# Patient Record
Sex: FEMALE | Race: WHITE | NOT HISPANIC OR LATINO | URBAN - METROPOLITAN AREA
[De-identification: names, ages, dates, MRNs, and addresses within clinical notes are randomized per-mention and may not be internally consistent; named-entity substitution may affect disease eponyms.]

---

## 2017-01-17 ENCOUNTER — COMMUNICATION - HEALTHEAST (OUTPATIENT)
Dept: ENDOCRINOLOGY | Facility: CLINIC | Age: 62
End: 2017-01-17

## 2017-02-01 ENCOUNTER — COMMUNICATION - HEALTHEAST (OUTPATIENT)
Dept: ENDOCRINOLOGY | Facility: CLINIC | Age: 62
End: 2017-02-01

## 2017-02-08 ENCOUNTER — COMMUNICATION - HEALTHEAST (OUTPATIENT)
Dept: ENDOCRINOLOGY | Facility: CLINIC | Age: 62
End: 2017-02-08

## 2017-05-15 ENCOUNTER — COMMUNICATION - HEALTHEAST (OUTPATIENT)
Dept: ENDOCRINOLOGY | Facility: CLINIC | Age: 62
End: 2017-05-15

## 2017-05-15 DIAGNOSIS — C73 THYROID CARCINOMA (H): ICD-10-CM

## 2020-08-11 ENCOUNTER — COMMUNICATION - HEALTHEAST (OUTPATIENT)
Dept: SCHEDULING | Facility: CLINIC | Age: 65
End: 2020-08-11

## 2020-08-12 ENCOUNTER — COMMUNICATION - HEALTHEAST (OUTPATIENT)
Dept: SCHEDULING | Facility: CLINIC | Age: 65
End: 2020-08-12

## 2020-08-13 ENCOUNTER — RECORDS - HEALTHEAST (OUTPATIENT)
Dept: LAB | Facility: CLINIC | Age: 65
End: 2020-08-13

## 2020-08-14 ENCOUNTER — COMMUNICATION - HEALTHEAST (OUTPATIENT)
Dept: GERIATRICS | Facility: CLINIC | Age: 65
End: 2020-08-14

## 2020-08-14 LAB
ANION GAP SERPL CALCULATED.3IONS-SCNC: 9 MMOL/L (ref 5–18)
BUN SERPL-MCNC: 10 MG/DL (ref 8–22)
CALCIUM SERPL-MCNC: 9.4 MG/DL (ref 8.5–10.5)
CHLORIDE BLD-SCNC: 92 MMOL/L (ref 98–107)
CO2 SERPL-SCNC: 26 MMOL/L (ref 22–31)
CREAT SERPL-MCNC: 0.57 MG/DL (ref 0.6–1.1)
GFR SERPL CREATININE-BSD FRML MDRD: >60 ML/MIN/1.73M2
GLUCOSE BLD-MCNC: 93 MG/DL (ref 70–125)
POTASSIUM BLD-SCNC: 4.2 MMOL/L (ref 3.5–5)
SODIUM SERPL-SCNC: 127 MMOL/L (ref 136–145)

## 2020-08-17 ENCOUNTER — COMMUNICATION - HEALTHEAST (OUTPATIENT)
Dept: GERIATRICS | Facility: CLINIC | Age: 65
End: 2020-08-17

## 2020-08-18 ENCOUNTER — OFFICE VISIT - HEALTHEAST (OUTPATIENT)
Dept: GERIATRICS | Facility: CLINIC | Age: 65
End: 2020-08-18

## 2020-08-18 ENCOUNTER — RECORDS - HEALTHEAST (OUTPATIENT)
Dept: LAB | Facility: CLINIC | Age: 65
End: 2020-08-18

## 2020-08-18 DIAGNOSIS — R53.1 GENERAL WEAKNESS: ICD-10-CM

## 2020-08-18 DIAGNOSIS — E87.1 HYPONATREMIA: ICD-10-CM

## 2020-08-18 DIAGNOSIS — E83.42 HYPOMAGNESEMIA: ICD-10-CM

## 2020-08-18 DIAGNOSIS — I10 ESSENTIAL HYPERTENSION: ICD-10-CM

## 2020-08-19 ENCOUNTER — OFFICE VISIT - HEALTHEAST (OUTPATIENT)
Dept: GERIATRICS | Facility: CLINIC | Age: 65
End: 2020-08-19

## 2020-08-19 DIAGNOSIS — E87.1 HYPONATREMIA: ICD-10-CM

## 2020-08-19 DIAGNOSIS — I10 ESSENTIAL HYPERTENSION: ICD-10-CM

## 2020-08-19 DIAGNOSIS — E83.42 HYPOMAGNESEMIA: ICD-10-CM

## 2020-08-19 DIAGNOSIS — R53.1 GENERAL WEAKNESS: ICD-10-CM

## 2020-08-19 LAB
ANION GAP SERPL CALCULATED.3IONS-SCNC: 9 MMOL/L (ref 5–18)
BASOPHILS # BLD AUTO: 0 THOU/UL (ref 0–0.2)
BASOPHILS NFR BLD AUTO: 1 % (ref 0–2)
BUN SERPL-MCNC: 13 MG/DL (ref 8–22)
CALCIUM SERPL-MCNC: 9.1 MG/DL (ref 8.5–10.5)
CHLORIDE BLD-SCNC: 98 MMOL/L (ref 98–107)
CO2 SERPL-SCNC: 25 MMOL/L (ref 22–31)
CREAT SERPL-MCNC: 0.63 MG/DL (ref 0.6–1.1)
EOSINOPHIL # BLD AUTO: 0.3 THOU/UL (ref 0–0.4)
EOSINOPHIL NFR BLD AUTO: 5 % (ref 0–6)
ERYTHROCYTE [DISTWIDTH] IN BLOOD BY AUTOMATED COUNT: 13 % (ref 11–14.5)
GFR SERPL CREATININE-BSD FRML MDRD: >60 ML/MIN/1.73M2
GLUCOSE BLD-MCNC: 89 MG/DL (ref 70–125)
HCT VFR BLD AUTO: 26 % (ref 35–47)
HGB BLD-MCNC: 8.7 G/DL (ref 12–16)
IMM GRANULOCYTES # BLD: 0 THOU/UL
IMM GRANULOCYTES NFR BLD: 0 %
LYMPHOCYTES # BLD AUTO: 1.5 THOU/UL (ref 0.8–4.4)
LYMPHOCYTES NFR BLD AUTO: 24 % (ref 20–40)
MAGNESIUM SERPL-MCNC: 1.7 MG/DL (ref 1.8–2.6)
MCH RBC QN AUTO: 31.4 PG (ref 27–34)
MCHC RBC AUTO-ENTMCNC: 33.5 G/DL (ref 32–36)
MCV RBC AUTO: 94 FL (ref 80–100)
MONOCYTES # BLD AUTO: 1.1 THOU/UL (ref 0–0.9)
MONOCYTES NFR BLD AUTO: 17 % (ref 2–10)
NEUTROPHILS # BLD AUTO: 3.4 THOU/UL (ref 2–7.7)
NEUTROPHILS NFR BLD AUTO: 53 % (ref 50–70)
PLATELET # BLD AUTO: 505 THOU/UL (ref 140–440)
PMV BLD AUTO: 9.8 FL (ref 8.5–12.5)
POTASSIUM BLD-SCNC: 4.2 MMOL/L (ref 3.5–5)
RBC # BLD AUTO: 2.77 MILL/UL (ref 3.8–5.4)
SODIUM SERPL-SCNC: 132 MMOL/L (ref 136–145)
WBC: 6.4 THOU/UL (ref 4–11)

## 2020-08-20 ENCOUNTER — OFFICE VISIT - HEALTHEAST (OUTPATIENT)
Dept: GERIATRICS | Facility: CLINIC | Age: 65
End: 2020-08-20

## 2020-08-20 DIAGNOSIS — E87.1 HYPONATREMIA: ICD-10-CM

## 2020-08-20 DIAGNOSIS — J44.9 CHRONIC OBSTRUCTIVE PULMONARY DISEASE, UNSPECIFIED COPD TYPE (H): ICD-10-CM

## 2020-08-20 DIAGNOSIS — S00.03XD HEMATOMA OF SCALP, SUBSEQUENT ENCOUNTER: ICD-10-CM

## 2020-08-20 DIAGNOSIS — R26.81 GAIT INSTABILITY: ICD-10-CM

## 2020-08-25 ENCOUNTER — OFFICE VISIT - HEALTHEAST (OUTPATIENT)
Dept: GERIATRICS | Facility: CLINIC | Age: 65
End: 2020-08-25

## 2020-08-25 DIAGNOSIS — F32.A ANXIETY AND DEPRESSION: ICD-10-CM

## 2020-08-25 DIAGNOSIS — I10 ESSENTIAL HYPERTENSION: ICD-10-CM

## 2020-08-25 DIAGNOSIS — F41.9 ANXIETY AND DEPRESSION: ICD-10-CM

## 2020-08-25 DIAGNOSIS — R26.81 GAIT INSTABILITY: ICD-10-CM

## 2020-08-25 DIAGNOSIS — J44.9 CHRONIC OBSTRUCTIVE PULMONARY DISEASE, UNSPECIFIED COPD TYPE (H): ICD-10-CM

## 2020-08-27 ENCOUNTER — OFFICE VISIT - HEALTHEAST (OUTPATIENT)
Dept: GERIATRICS | Facility: CLINIC | Age: 65
End: 2020-08-27

## 2020-08-27 DIAGNOSIS — I10 ESSENTIAL HYPERTENSION: ICD-10-CM

## 2020-08-27 DIAGNOSIS — J44.9 CHRONIC OBSTRUCTIVE PULMONARY DISEASE, UNSPECIFIED COPD TYPE (H): ICD-10-CM

## 2020-08-27 DIAGNOSIS — F41.9 ANXIETY AND DEPRESSION: ICD-10-CM

## 2020-08-27 DIAGNOSIS — F32.A ANXIETY AND DEPRESSION: ICD-10-CM

## 2020-08-27 DIAGNOSIS — E87.1 HYPONATREMIA: ICD-10-CM

## 2020-08-31 ENCOUNTER — AMBULATORY - HEALTHEAST (OUTPATIENT)
Dept: GERIATRICS | Facility: CLINIC | Age: 65
End: 2020-08-31

## 2020-11-18 ENCOUNTER — HOME CARE/HOSPICE - HEALTHEAST (OUTPATIENT)
Dept: HOME HEALTH SERVICES | Facility: HOME HEALTH | Age: 65
End: 2020-11-18

## 2021-05-27 ENCOUNTER — RECORDS - HEALTHEAST (OUTPATIENT)
Dept: ADMINISTRATIVE | Facility: CLINIC | Age: 66
End: 2021-05-27

## 2021-05-27 VITALS
SYSTOLIC BLOOD PRESSURE: 131 MMHG | TEMPERATURE: 96.7 F | RESPIRATION RATE: 16 BRPM | OXYGEN SATURATION: 99 % | DIASTOLIC BLOOD PRESSURE: 61 MMHG | HEART RATE: 71 BPM

## 2021-05-28 ENCOUNTER — RECORDS - HEALTHEAST (OUTPATIENT)
Dept: ADMINISTRATIVE | Facility: CLINIC | Age: 66
End: 2021-05-28

## 2021-05-29 ENCOUNTER — RECORDS - HEALTHEAST (OUTPATIENT)
Dept: ADMINISTRATIVE | Facility: CLINIC | Age: 66
End: 2021-05-29

## 2021-05-31 ENCOUNTER — RECORDS - HEALTHEAST (OUTPATIENT)
Dept: ADMINISTRATIVE | Facility: CLINIC | Age: 66
End: 2021-05-31

## 2021-06-02 ENCOUNTER — RECORDS - HEALTHEAST (OUTPATIENT)
Dept: ADMINISTRATIVE | Facility: CLINIC | Age: 66
End: 2021-06-02

## 2021-06-10 NOTE — TELEPHONE ENCOUNTER
[facility] Nurse Triage Telephone Note      Provider: LISA Booker  Facility: [facility]    Facility Type: TCU    Caller: Janet  Call Back Number:  [phone]    Allergies: Citalopram; Fluoxetine; Levothyroxine (bulk); Nicoderm; Pravastatin; Rosuvastatin; and Codeine    Reason for call: Pt C/O no Bm for 1 week. Abdomin not distended, denies pain, bowel sounds sluggish X 4. Not on any bowel meds.     Verbal Order/Direction given by Provider: Do rectalcheck & give Duloclax supp R first. Miralax 17Gm po daily PRN, Senna S 1 two times a day.    Provider giving order: LISA Booker    Verbal order given to: Janet Haynes RN

## 2021-06-10 NOTE — PROGRESS NOTES
[facility]    Facility:   [facility] [[id number]]   Code Status: FULL CODE      CHIEF COMPLAINT/REASON FOR VISIT:  Chief Complaint   Patient presents with     Problem Visit     hx cva, rehab, htn,sleep       HISTORY:      HPI: Rafaela is a 64 y.o. female who the transitional care unit and who I had the opportunity to revisit with not only secondary to her hospitalization August 9, 2020 through August 13, 2020 secondary to a fall sustaining a left scalp laceration  Hyponatremia along with discussion of her rehabilitation process.  Regarding rehabilitation I did have a chance to talk to  and staff to see if she would be eligible to be able to go home.  She wants to go home soon as possible and she does feel like she is learning some things here but would like to go home.  Does have a history of CVA along with a history of gait instability.  She is on a regular diet and getting extra nutrient supplements.  No respiratory issues and no inhalers as needed.  She has been normotensive and afebrile and also on room air.  The left scalp does have a small hematoma but healing well.  Recently increase the trazodone 100 mg and that has been helpful for her sleep.  Denies discomfort.  Constipation is resolved.    Past Medical History:   Diagnosis Date     Adrenal adenoma, left      Anxiety     per pt     Depression     per pt     History of alcohol abuse      History of CVA with residual deficit     head and neck tremor, right sided weakness     History of skin cancer of unknown type      Hypertension     on lisinopril per pt is well controlled     Memory impairment     per pt     Postsurgical hypothyroidism      Thyroid cancer (H)      Tobacco use disorder      Urinary incontinence              Family History   Adopted: Yes   Problem Relation Age of Onset     Hypertension Mother      Diabetes Sister      Social History     Socioeconomic History     Marital status:       Spouse name: Not on file     Number of children: Not on file     Years of education: Not on file     Highest education level: Not on file   Occupational History     Not on file   Social Needs     Financial resource strain: Not on file     Food insecurity     Worry: Not on file     Inability: Not on file     Transportation needs     Medical: Not on file     Non-medical: Not on file   Tobacco Use     Smoking status: Current Every Day Smoker     Packs/day: 1.00     Years: 10.00     Pack years: 10.00     Smokeless tobacco: Never Used   Substance and Sexual Activity     Alcohol use: Yes     Alcohol/week: 35.0 standard drinks     Types: 35 Cans of beer per week     Comment: 4-5 beers per day     Drug use: No     Comment: pt denies using drugs of any kind     Sexual activity: Not on file   Lifestyle     Physical activity     Days per week: Not on file     Minutes per session: Not on file     Stress: Not on file   Relationships     Social connections     Talks on phone: Not on file     Gets together: Not on file     Attends Voodoo service: Not on file     Active member of club or organization: Not on file     Attends meetings of clubs or organizations: Not on file     Relationship status: Not on file     Intimate partner violence     Fear of current or ex partner: Not on file     Emotionally abused: Not on file     Physically abused: Not on file     Forced sexual activity: Not on file   Other Topics Concern     Not on file   Social History Narrative     Not on file         Review of Systems  She currently denies any fever chills fatigue cough or cold or flulike symptoms nausea vomiting diarrhea dysuria headache rashes unusual myalgias or arthralgias.  History of alcohol intoxication, anxiety, hypertension, COPD.    Current Outpatient Medications   Medication Sig Note     albuterol (PROVENTIL HFA;VENTOLIN HFA) 90 mcg/actuation inhaler Inhale 2 puffs every 6 (six) hours as needed for wheezing.      DULoxetine  (CYMBALTA) 60 MG capsule Take 60 mg by mouth daily.      folic acid (FOLVITE) 1 MG tablet Take 1 tablet (1 mg total) by mouth daily.      lisinopril (PRINIVIL,ZESTRIL) 10 MG tablet Take 10 mg by mouth daily.      multivitamin therapeutic tablet Take 1 tablet by mouth daily.      polyethylene glycol (MIRALAX) 17 gram packet Take 17 g by mouth daily as needed.      senna-docusate (SENNOSIDES-DOCUSATE SODIUM) 8.6-50 mg tablet Take 1 tablet by mouth 2 (two) times a day.      SYNTHROID 88 mcg tablet Take 88 mcg by mouth daily.      thiamine 100 MG tablet Take 1 tablet (100 mg total) by mouth daily.      tiotropium-olodateroL (STIOLTO RESPIMAT) 2.5-2.5 mcg/actuation Mist inhaler Inhale 1 puff daily as needed (As needed).  8/9/2020: Prescribed but patient not compliant      traZODone (DESYREL) 50 MG tablet Take 1 tablet (50 mg total) by mouth at bedtime. (Patient taking differently: Take 100 mg by mouth at bedtime. )        There were no vitals filed for this visit.  Blood pressure 96/52, pulse 78, respirations 16, temperature 97.8  Physical Exam  Head is normocephalic.  Left posterior scalp does have hematoma.  neck is supple without adenopathy. There is bruising to the left posterior scalp and neck area.  Lung sounds are clear throughout.  Cardiovascular is normal without murmurs.  No lower extremity edema.  Gastrointestinal soft and nontender and positive bowel sounds.  Musculoskeletal she is an assist of 1.  Not having any discomfort.  Psychiatric: Pleasant affect.      LABS:   Lab Results   Component Value Date    WBC 6.4 08/19/2020    HGB 8.7 (L) 08/19/2020    HCT 26.0 (L) 08/19/2020    MCV 94 08/19/2020     (H) 08/19/2020     Results for orders placed or performed in visit on 08/19/20   Basic Metabolic Panel   Result Value Ref Range    Sodium 132 (L) 136 - 145 mmol/L    Potassium 4.2 3.5 - 5.0 mmol/L    Chloride 98 98 - 107 mmol/L    CO2 25 22 - 31 mmol/L    Anion Gap, Calculation 9 5 - 18 mmol/L    Glucose  89 70 - 125 mg/dL    Calcium 9.1 8.5 - 10.5 mg/dL    BUN 13 8 - 22 mg/dL    Creatinine 0.63 0.60 - 1.10 mg/dL    GFR MDRD Af Amer >60 >60 mL/min/1.73m2    GFR MDRD Non Af Amer >60 >60 mL/min/1.73m2       Lab Results   Component Value Date    ALT 14 08/09/2020    AST 30 08/09/2020    ALKPHOS 59 08/09/2020    BILITOT 0.3 08/09/2020         ASSESSMENT:      ICD-10-CM    1. Chronic obstructive pulmonary disease, unspecified COPD type (H)  J44.9    2. Essential hypertension  I10    3. Gait instability  R26.81    4. Anxiety and depression  F41.9     F32.9        PLAN:    She is examined.  Blood pressures are good.  She has participated in therapy.  Once again I did relate to the staff and  including charge nurse that she would like a meeting to talk up going home.      Electronically signed by: Michael Duane Johnson, CNP

## 2021-06-10 NOTE — PROGRESS NOTES
[facility]    Facility:   [facility] [[id number]]   Code Status: FULL CODE  PCP: Maria Elena Morin MD   Phone: [phone]   Fax: [phone]      CHIEF COMPLAINT/REASON FOR VISIT:  Chief Complaint   Patient presents with     Discharge Summary       HISTORY COURSE:  Rafaela is a 64 y.o. female who was admitted to the hospital August 9, 2020 through August 13, 2020 secondary to a fall with a scalp laceration.  Her sodium level was 109 and was sent to the intensive care unit for close monitoring and management.  She does have a history of alcohol abuse and also with the admission alcohol intoxication with complications.  She drinks approximately 5 beers per day and the alcohol level was 113.  Did not show or exhibit any signs of alcohol withdrawal.  She was profoundly hyponatremic with a sodium level of 109 baseline is between 122-128.  Urine and serum osmolality were normal.  Suggested that the hyponatremia was secondary to acute cord and ongoing alcohol use disorder.  The sodium did stabilize at 127 and then placed her on a 1800 mL daily fluid restriction.  Regarding the scalp injuries she had a hematoma and a head CT did note the hematoma without fracture.  There was concern for UTI secondary to leukosis and lites on admission were 18.6 did receive 3 days of ceftriaxone but no symptoms of UTI.  Also did have hypomagnesemia with a magnesium level of 1.3.  She also has a history of moderate malnutrition and unintentional weight loss and did receive a regular diet along with mealtime supplements.  Her liver function tests are abnormal.  Lipase was at 15 EKG did show sinus tachycardia.  Negative CT cervical spine and the chest x-ray was clear.  She has been able to participate with the rehabilitation services.  States that she has been able to ambulate over 300 feet and a low fall risk.  Her slums 19/30.  We had a chance to talk about the strength that she has gained.   Her only issue is that she had bouts of constipation.  For sleep we did increase the trazodone 100 mg in at bedtime rather than 50 mg but at home she was on 200 mg at bedtime.  Sodium level 132 prior to that was 127 on August 14 and she is on a free water restriction of 1800 cc.  Regular diet.  Getting extra nutrient supplements.  Review of Systems  She currently denies any fever chills fatigue cough or cold or flulike symptoms nausea vomiting diarrhea dysuria headache rashes unusual myalgias or arthralgias.  History of alcohol intoxication, anxiety, hypertension, COPD.  There were no vitals filed for this visit.  Blood pressure 112/76, pulse 80, respirations 18 and temperature 97.1  Physical Exam  Head is normocephalic.   neck is supple without adenopathy.  Lung sounds are clear throughout.  Cardiovascular is normal without murmurs.  No lower extremity edema.  Gastrointestinal soft and nontender.  Musculoskeletal ; able to ambulate over 300 feet.  Not having any discomfort.  Psychiatric: Pleasant affect.        Lab Results   Component Value Date    WBC 6.4 08/19/2020    HGB 8.7 (L) 08/19/2020    HCT 26.0 (L) 08/19/2020    MCV 94 08/19/2020     (H) 08/19/2020     Results for orders placed or performed in visit on 08/19/20   Basic Metabolic Panel   Result Value Ref Range    Sodium 132 (L) 136 - 145 mmol/L    Potassium 4.2 3.5 - 5.0 mmol/L    Chloride 98 98 - 107 mmol/L    CO2 25 22 - 31 mmol/L    Anion Gap, Calculation 9 5 - 18 mmol/L    Glucose 89 70 - 125 mg/dL    Calcium 9.1 8.5 - 10.5 mg/dL    BUN 13 8 - 22 mg/dL    Creatinine 0.63 0.60 - 1.10 mg/dL    GFR MDRD Af Amer >60 >60 mL/min/1.73m2    GFR MDRD Non Af Amer >60 >60 mL/min/1.73m2       Lab Results   Component Value Date    ALT 14 08/09/2020    AST 30 08/09/2020    ALKPHOS 59 08/09/2020    BILITOT 0.3 08/09/2020 August 19, 2020 magnesium 1.7  Lab Results   Component Value Date    TSH 4.60 08/09/2020       MEDICATION LIST:  Current Outpatient  Medications   Medication Sig     acetaminophen (TYLENOL) 500 MG tablet Take 1,000 mg by mouth daily before breakfast.     albuterol (PROVENTIL HFA;VENTOLIN HFA) 90 mcg/actuation inhaler Inhale 2 puffs every 6 (six) hours as needed for wheezing.     DULoxetine (CYMBALTA) 60 MG capsule Take 60 mg by mouth daily.     folic acid (FOLVITE) 1 MG tablet Take 1 tablet (1 mg total) by mouth daily.     lisinopril (PRINIVIL,ZESTRIL) 10 MG tablet Take 10 mg by mouth daily.     multivitamin therapeutic tablet Take 1 tablet by mouth daily.     polyethylene glycol (MIRALAX) 17 gram packet Take 17 g by mouth daily as needed.     senna-docusate (SENNOSIDES-DOCUSATE SODIUM) 8.6-50 mg tablet Take 1 tablet by mouth 2 (two) times a day.     SYNTHROID 88 mcg tablet Take 88 mcg by mouth daily.     thiamine 100 MG tablet Take 1 tablet (100 mg total) by mouth daily.     tiotropium-olodateroL (STIOLTO RESPIMAT) 2.5-2.5 mcg/actuation Mist inhaler Inhale 1 puff daily as needed (As needed).      traZODone (DESYREL) 50 MG tablet Take 1 tablet (50 mg total) by mouth at bedtime. (Patient taking differently: Take 100 mg by mouth at bedtime. )       DISCHARGE DIAGNOSIS:    ICD-10-CM    1. Hyponatremia  E87.1    2. Chronic obstructive pulmonary disease, unspecified COPD type (H)  J44.9    3. Essential hypertension  I10    4. Anxiety and depression  F41.9     F32.9        MEDICAL EQUIPMENT NEEDS:  None    DISCHARGE PLAN/FACE TO FACE:  I certify that services are/were furnished while this patient was under the care of a physician and that a physician or an allowed non-physician practitioner (NPP), had a face-to-face encounter that meets the physician face-to-face encounter requirements. The encounter was in whole, or in part, related to the primary reason for home health. The patient is confined to his/her home and needs intermittent skilled nursing, physical therapy, speech-language pathology, or the continued need for occupational therapy. A plan of  care has been established by a physician and is periodically reviewed by a physician.  Date of Face-to-Face Encounter: August 27, 2020    I certify that, based on my findings, the following services are medically necessary home health services: She will be discharging to home with current medications along with home care services from [facility] including physical therapy and nursing.  Her anticipated discharge date is August 29, 2020    My clinical findings support the need for the above skilled services because: (Please write a brief narrative summary that describes what the RN, PT, SLP, or other services will be doing in the home. A list of diagnoses in this section does not meet the CMS requirements.)  She will require the skilled services secondary to not only most recent hospitalization but also home safety and transfers along with nursing medication management.    This patient is homebound because: (Please write a brief narrative summary describing the functional limitations as to why this patient is homebound and specifically what makes this patient homebound.)  Secondary to chronic multiple medical conditions including her recent hospitalization along with home safety and nursing for medication management    The patient is, or has been, under my care and I have initiated the establishment of the plan of care. This patient will be followed by a physician who will periodically review the plan of care.    Schedule follow up visit with primary care provider within 7 days to reestablish care.  She will follow-up with her primary care doctor regarding medication management and any future laboratory including her sodium level.  We went over medications again and does feel comfortable with the current medications as well as her overall care and did feel that it was a very successful and productive transitional care stay.    Discharge coordination care greater than 30 minutes  Electronically signed by: Michael Duane  Roney, CNP

## 2021-06-10 NOTE — PROGRESS NOTES
[facility]      Facility:    [facility] [[id number]]  Code Status: FULL CODE      Chief Complaint/Reason for Visit:  Chief Complaint   Patient presents with     H & P     Severe hyponatremia, alcoholic intoxication, anxiety and depression, essential hypertension, chronic obstructive pulmonary disease, low magnesium levels, acute cystitis without hematuria, alcohol withdrawal syndrome with complications, tobacco abuse.       HPI:   Rafaela is a 64 y.o. female who admitted to the hospital 8/9/2020.  She does have COPD from years of smoking and also hypothyroidism.  Hypertension she also does use a lot of alcohol and she came into the hospital through the [facility] emergency department have a low sodium of 109.  There is concern for acute alcohol withdrawal and blood alcohol was 113.  She did not exhibit any signs of acute alcohol withdrawal during the inpatient stay.  And she is open to quitting drinking.  She had a scalp laceration that was repaired.    Patient also had profound hyponatremia acutely on top of chronic hyponatremia with a baseline sodium of 1 22-1 28.  On the 14th it was rechecked and it was 127 and will continue to monitor.  In the hospital she was given careful rehydration electrolyte management.  Her magnesium was also low.  Potassium was pretty much normal.  She was treated properly and transferred here to the TCU at [facility] in stable condition.    Patient seen in chair comfortably says she feels okay we did discuss her drinking and smoking in detail and she said she is quit both of them does not want to go back.  I did explain her sodium on the 14th was 127 which is within her normal baseline.  She says she does not have any pain issues but pain in her head and she does have the sutures in at this time.    Sutures are curled around difficult to take out nurse practitioner is going to take them out tomorrow and he knows about this and has been  talking to the nursing staff.  She otherwise is doing okay and has no new issues.    Past Medical History:  Past Medical History:   Diagnosis Date     Adrenal adenoma, left      Anxiety     per pt     Depression     per pt     History of alcohol abuse      History of CVA with residual deficit     head and neck tremor, right sided weakness     History of skin cancer of unknown type      Hypertension     on lisinopril per pt is well controlled     Memory impairment     per pt     Postsurgical hypothyroidism      Thyroid cancer (H)      Tobacco use disorder      Urinary incontinence            Surgical History:  Past Surgical History:   Procedure Laterality Date     APPENDECTOMY       History of  section       MIDLINE INSERTION - DOUBLE LUMEN  8/10/2020          THYROID SURGERY         Family History:   Family History   Adopted: Yes   Problem Relation Age of Onset     Hypertension Mother      Diabetes Sister        Social History:    Social History     Socioeconomic History     Marital status:      Spouse name: None     Number of children: None     Years of education: None     Highest education level: None   Occupational History     None   Social Needs     Financial resource strain: None     Food insecurity     Worry: None     Inability: None     Transportation needs     Medical: None     Non-medical: None   Tobacco Use     Smoking status: Current Every Day Smoker     Packs/day: 1.00     Years: 10.00     Pack years: 10.00     Smokeless tobacco: Never Used   Substance and Sexual Activity     Alcohol use: Yes     Alcohol/week: 35.0 standard drinks     Types: 35 Cans of beer per week     Comment: 4-5 beers per day     Drug use: No     Comment: pt denies using drugs of any kind     Sexual activity: None   Lifestyle     Physical activity     Days per week: None     Minutes per session: None     Stress: None   Relationships     Social connections     Talks on phone: None     Gets together: None     Attends  Yazdanism service: None     Active member of club or organization: None     Attends meetings of clubs or organizations: None     Relationship status: None     Intimate partner violence     Fear of current or ex partner: None     Emotionally abused: None     Physically abused: None     Forced sexual activity: None   Other Topics Concern     None   Social History Narrative     None          Review of Systems   Constitutional:        Patient denies any pain fevers chills nausea vomit diarrhea change in vision hearing taste or smell weakness one-sided chest pain shortness of breath.  Is no current shortness no polyphagia polydipsia polyuria depression or anxiety and the main review of systems is negative.       Vitals:    08/19/20 1232   BP: 131/61   Pulse: 71   Resp: 16   Temp: 96.7  F (35.9  C)   SpO2: 99%       Physical Exam  Constitutional:       General: She is not in acute distress.  HENT:      Head:      Comments: Sutures are still embedded and there will come out tomorrow.     Mouth/Throat:      Mouth: Mucous membranes are moist.      Pharynx: Oropharynx is clear.   Eyes:      General:         Right eye: No discharge.         Left eye: No discharge.   Cardiovascular:      Pulses: Normal pulses.   Pulmonary:      Effort: Pulmonary effort is normal. No respiratory distress.   Abdominal:      General: There is distension.      Tenderness: There is no abdominal tenderness.   Skin:     General: Skin is warm and dry.   Neurological:      Mental Status: She is alert. Mental status is at baseline.   Psychiatric:         Mood and Affect: Mood normal.         Behavior: Behavior normal.         Medication List:  Current Outpatient Medications   Medication Sig     albuterol (PROVENTIL HFA;VENTOLIN HFA) 90 mcg/actuation inhaler Inhale 2 puffs every 6 (six) hours as needed for wheezing.     DULoxetine (CYMBALTA) 60 MG capsule Take 60 mg by mouth daily.     folic acid (FOLVITE) 1 MG tablet Take 1 tablet (1 mg total) by mouth  daily.     lisinopril (PRINIVIL,ZESTRIL) 10 MG tablet Take 10 mg by mouth daily.     multivitamin therapeutic tablet Take 1 tablet by mouth daily.     polyethylene glycol (MIRALAX) 17 gram packet Take 17 g by mouth daily as needed.     senna-docusate (SENNOSIDES-DOCUSATE SODIUM) 8.6-50 mg tablet Take 1 tablet by mouth 2 (two) times a day.     SYNTHROID 88 mcg tablet Take 88 mcg by mouth daily.     thiamine 100 MG tablet Take 1 tablet (100 mg total) by mouth daily.     tiotropium-olodateroL (STIOLTO RESPIMAT) 2.5-2.5 mcg/actuation Mist inhaler Inhale 1 puff daily as needed (As needed).      traZODone (DESYREL) 50 MG tablet Take 1 tablet (50 mg total) by mouth at bedtime. (Patient taking differently: Take 100 mg by mouth at bedtime. )       Labs: On 8/14/2020 sodium was 127, potassium 4.2, CO2 was 26, calcium was 9.4, BUN was 10, creatinine 0.57, GFR is greater than 60.      Assessment:    ICD-10-CM    1. Hyponatremia  E87.1    2. Hypomagnesemia  E83.42    3. Essential hypertension  I10    4. General weakness  R53.1        Plan: Plan at this time will continue with physical and occupational therapy at this time we will also check a magnesium tomorrow and check another sodium tomorrow.  We will also had nurse practitioner remove the stay sutures tomorrow if not they can get it then she will need to go in to have the removed.  However I believe our nurse petitioner can do that okay at this time.  I will continue to monitor above medical problems and no other changes to care plan at this time.        Electronically signed by: David Pereira, DO

## 2021-06-10 NOTE — TELEPHONE ENCOUNTER
[facility] Nurse Triage Telephone Note      Provider: LISA Booker  Facility: [facility]    Facility Type: TCU    Caller: Elmer  Call Back Number:  [phone]    Allergies: Citalopram; Fluoxetine; Levothyroxine (bulk); Nicoderm; Pravastatin; Rosuvastatin; and Codeine    Reason for call: Nurse reporting BMP results.  Patient is on a 1800cc/day total fluid restriction.  Also currently receiving Lisinopril 10mg daily.       Verbal Order/Direction given by Provider: No new orders.      Provider giving order: LISA Booker    Verbal order given to: Elmer Wright RN

## 2021-06-10 NOTE — PROGRESS NOTES
[facility]    Facility:   [facility] [[id number]]   Code Status: FULL CODE      CHIEF COMPLAINT/REASON FOR VISIT:  Chief Complaint   Patient presents with     Problem Visit     tcu, cog, nutrition, hypoNat, etoh       HISTORY:      HPI: Rafaela is a 64 y.o. female who was admitted to the hospital August 9, 2020 through August 13, 2020 secondary to a fall with a scalp laceration.  Her sodium level was 109 and was sent to the intensive care unit for close monitoring and management.  She does have a history of alcohol abuse and also with the admission alcohol intoxication with complications.  She drinks approximately 5 beers per day and the alcohol level was 113.  Did not show or exhibit any signs of alcohol withdrawal.  She was profoundly hyponatremic with a sodium level of 109 baseline is between 122-128.  Urine and serum osmolality were normal.  Suggested that the hyponatremia was secondary to acute cord and ongoing alcohol use disorder.  The sodium did stabilize at 127 and then placed her on a 1800 mL daily fluid restriction.  Regarding the scalp injuries she had a hematoma and a head CT did note the hematoma without fracture.  There was concern for UTI secondary to leukosis and lites on admission were 18.6 did receive 3 days of ceftriaxone but no symptoms of UTI.  Also did have hypomagnesemia with a magnesium level of 1.3.  She also has a history of moderate malnutrition and unintentional weight loss and did receive a regular diet along with mealtime supplements.  Her liver function tests are abnormal.  Lipase was at 15 EKG did show sinus tachycardia.  Negative CT cervical spine and the chest x-ray was clear.  She currently is in the transitional care unit to try to improve her strength balance and overall conditioning.  She is working with a walker in the transitional care unit at home she also does have a four-wheel walker but otherwise self propelling a wheelchair  in the hallway.  She initially is from [place] and lived there in [facility] for about 50 years and now living in [place].  Yesterday I did receive a phone call that she is constipated I did do a suppository and put her on some stool softeners which have been successful.  She currently has been normotensive and afebrile and also on room air.  Her appetite is good.  Denies discomfort.  Had a chat talk about her laboratory studies as well as repeat these laboratory studies along with the rehabilitation process.     Past Medical History:   Diagnosis Date     Adrenal adenoma, left      Anxiety     per pt     Depression     per pt     History of alcohol abuse      History of CVA with residual deficit     head and neck tremor, right sided weakness     History of skin cancer of unknown type      Hypertension     on lisinopril per pt is well controlled     Memory impairment     per pt     Postsurgical hypothyroidism      Thyroid cancer (H)      Tobacco use disorder      Urinary incontinence              Family History   Adopted: Yes   Problem Relation Age of Onset     Hypertension Mother      Diabetes Sister      Social History     Socioeconomic History     Marital status:      Spouse name: Not on file     Number of children: Not on file     Years of education: Not on file     Highest education level: Not on file   Occupational History     Not on file   Social Needs     Financial resource strain: Not on file     Food insecurity     Worry: Not on file     Inability: Not on file     Transportation needs     Medical: Not on file     Non-medical: Not on file   Tobacco Use     Smoking status: Current Every Day Smoker     Packs/day: 1.00     Years: 10.00     Pack years: 10.00     Smokeless tobacco: Never Used   Substance and Sexual Activity     Alcohol use: Yes     Alcohol/week: 35.0 standard drinks     Types: 35 Cans of beer per week     Comment: 4-5 beers per day     Drug use: No     Comment: pt denies  using drugs of any kind     Sexual activity: Not on file   Lifestyle     Physical activity     Days per week: Not on file     Minutes per session: Not on file     Stress: Not on file   Relationships     Social connections     Talks on phone: Not on file     Gets together: Not on file     Attends Nondenominational service: Not on file     Active member of club or organization: Not on file     Attends meetings of clubs or organizations: Not on file     Relationship status: Not on file     Intimate partner violence     Fear of current or ex partner: Not on file     Emotionally abused: Not on file     Physically abused: Not on file     Forced sexual activity: Not on file   Other Topics Concern     Not on file   Social History Narrative     Not on file         Review of Systems  She currently denies any fever chills fatigue cough or cold or flulike symptoms nausea vomiting diarrhea dysuria headache rashes unusual myalgias or arthralgias.  History of alcohol intoxication, anxiety, hypertension, COPD.    Current Outpatient Medications:      albuterol (PROVENTIL HFA;VENTOLIN HFA) 90 mcg/actuation inhaler, Inhale 2 puffs every 6 (six) hours as needed for wheezing., Disp: 1 Inhaler, Rfl: 0     DULoxetine (CYMBALTA) 60 MG capsule, Take 60 mg by mouth daily., Disp: , Rfl:      folic acid (FOLVITE) 1 MG tablet, Take 1 tablet (1 mg total) by mouth daily., Disp: 30 tablet, Rfl: 0     lisinopril (PRINIVIL,ZESTRIL) 10 MG tablet, Take 10 mg by mouth daily., Disp: , Rfl:      multivitamin therapeutic tablet, Take 1 tablet by mouth daily., Disp: 30 tablet, Rfl: 0     SYNTHROID 88 mcg tablet, Take 88 mcg by mouth daily., Disp: , Rfl:      thiamine 100 MG tablet, Take 1 tablet (100 mg total) by mouth daily., Disp: 30 tablet, Rfl: 0     tiotropium-olodateroL (STIOLTO RESPIMAT) 2.5-2.5 mcg/actuation Mist inhaler, Inhale 1 puff daily as needed (As needed). , Disp: , Rfl:      traZODone (DESYREL) 50 MG tablet, Take 1 tablet (50 mg total) by mouth at  bedtime. (Patient taking differently: Take 100 mg by mouth at bedtime. ), Disp: 60 tablet, Rfl: 0    There were no vitals filed for this visit.  Blood pressure 130/76 pulse 82 respiration 16 temperature 97.4 saturation room air 99%  Physical Exam  Head is normocephalic.  Left posterior scalp does have a hematoma in a couple of staples.  Conjunctiva is clear neck without adenopathy.  There is bruising to the left posterior scalp and neck area.  Lung sounds are clear throughout.  Cardiovascular is normal without murmurs.  No lower extremity edema.  Gastrointestinal soft and nontender and positive bowel sounds.  Musculoskeletal she is an assist of 1.  Not having any comfort.  Psychiatric: Pleasant affect.  LABS:   Lab Results   Component Value Date    WBC 8.1 08/10/2020    HGB 10.9 (L) 08/10/2020    HCT 29.7 (L) 08/10/2020    MCV 87 08/10/2020     08/10/2020     Results for orders placed or performed in visit on 08/14/20   Basic Metabolic Panel   Result Value Ref Range    Sodium 127 (L) 136 - 145 mmol/L    Potassium 4.2 3.5 - 5.0 mmol/L    Chloride 92 (L) 98 - 107 mmol/L    CO2 26 22 - 31 mmol/L    Anion Gap, Calculation 9 5 - 18 mmol/L    Glucose 93 70 - 125 mg/dL    Calcium 9.4 8.5 - 10.5 mg/dL    BUN 10 8 - 22 mg/dL    Creatinine 0.57 (L) 0.60 - 1.10 mg/dL    GFR MDRD Af Amer >60 >60 mL/min/1.73m2    GFR MDRD Non Af Amer >60 >60 mL/min/1.73m2     Lab Results   Component Value Date    TSH 4.60 08/09/2020       No results found for: CHOL  No results found for: HDL  No results found for: LDLCALC  No results found for: TRIG  No components found for: CHOLHDL  Lab Results   Component Value Date    ALBUMIN 3.1 (L) 08/10/2020         ASSESSMENT:      ICD-10-CM    1. Hyponatremia  E87.1    2. General weakness  R53.1    3. Hypomagnesemia  E83.42    4. Essential hypertension  I10        PLAN:    Had a chance to go over her laboratory studies and they did recheck her TSH.  Meantime tomorrow we will check her BMP  magnesium and CBC due to hyponatremia exists.  The suppository and stool softeners are helpful we will remove the staples of her scalp today.  Getting extra nutrient supplements twice daily.  She states that she is about 50% better since the hospitalization and will be working with therapy.    For documentation purposes total visit 35 minutes to which over 50% was spent with the patient going over her hospitalization and her chronic medical conditions along with her laboratory studies the stay on the transitional care unit, nutrition, future laboratory studies and management of her chronic medical conditions.        Electronically signed by: Michael Duane Johnson, CNP

## 2021-06-10 NOTE — PROGRESS NOTES
[facility]    Facility:   [facility] [[id number]]   Code Status: FULL CODE      CHIEF COMPLAINT/REASON FOR VISIT:  Chief Complaint   Patient presents with     Problem Visit     rehab, fall, htn, bp, labs,        HISTORY:      HPI: Rafaela is a 64 y.o. female who I had the pleasure revisiting with not only secondary to her hospitalization August 9 through August 13, 2020 secondary to a fall sustaining a scalp laceration along with hyponatremia as well as discussion of her rehabilitation process and laboratory studies  Regarding therapy they would like to keep her here another 2 weeks.  The dizziness has significantly decreased.  There is a questionable concerns regarding balance and her slums 19/30.  Went over her laboratory studies and the sodium is now 132 prior to that on August 14 the sodium was at 127.  Magnesium 1.7.  She does have a couple scalp staples in place the staff did not remove them so I was able to remove them today.  No constipation.  For sleep the 100 mg of trazodone rather than the 50 mg has been helpful.  She is in good spirits today.  Did not have any other questions    Past Medical History:   Diagnosis Date     Adrenal adenoma, left      Anxiety     per pt     Depression     per pt     History of alcohol abuse      History of CVA with residual deficit     head and neck tremor, right sided weakness     History of skin cancer of unknown type      Hypertension     on lisinopril per pt is well controlled     Memory impairment     per pt     Postsurgical hypothyroidism      Thyroid cancer (H)      Tobacco use disorder      Urinary incontinence              Family History   Adopted: Yes   Problem Relation Age of Onset     Hypertension Mother      Diabetes Sister      Social History     Socioeconomic History     Marital status:      Spouse name: Not on file     Number of children: Not on file     Years of education: Not on file     Highest  education level: Not on file   Occupational History     Not on file   Social Needs     Financial resource strain: Not on file     Food insecurity     Worry: Not on file     Inability: Not on file     Transportation needs     Medical: Not on file     Non-medical: Not on file   Tobacco Use     Smoking status: Current Every Day Smoker     Packs/day: 1.00     Years: 10.00     Pack years: 10.00     Smokeless tobacco: Never Used   Substance and Sexual Activity     Alcohol use: Yes     Alcohol/week: 35.0 standard drinks     Types: 35 Cans of beer per week     Comment: 4-5 beers per day     Drug use: No     Comment: pt denies using drugs of any kind     Sexual activity: Not on file   Lifestyle     Physical activity     Days per week: Not on file     Minutes per session: Not on file     Stress: Not on file   Relationships     Social connections     Talks on phone: Not on file     Gets together: Not on file     Attends Sabianism service: Not on file     Active member of club or organization: Not on file     Attends meetings of clubs or organizations: Not on file     Relationship status: Not on file     Intimate partner violence     Fear of current or ex partner: Not on file     Emotionally abused: Not on file     Physically abused: Not on file     Forced sexual activity: Not on file   Other Topics Concern     Not on file   Social History Narrative     Not on file         Review of Systems  She currently denies any fever chills fatigue cough or cold or flulike symptoms nausea vomiting diarrhea dysuria headache rashes unusual myalgias or arthralgias.  History of alcohol intoxication, anxiety, hypertension, COPD.      Current Outpatient Medications:      albuterol (PROVENTIL HFA;VENTOLIN HFA) 90 mcg/actuation inhaler, Inhale 2 puffs every 6 (six) hours as needed for wheezing., Disp: 1 Inhaler, Rfl: 0     DULoxetine (CYMBALTA) 60 MG capsule, Take 60 mg by mouth daily., Disp: , Rfl:      folic acid (FOLVITE) 1 MG tablet, Take 1  tablet (1 mg total) by mouth daily., Disp: 30 tablet, Rfl: 0     lisinopril (PRINIVIL,ZESTRIL) 10 MG tablet, Take 10 mg by mouth daily., Disp: , Rfl:      multivitamin therapeutic tablet, Take 1 tablet by mouth daily., Disp: 30 tablet, Rfl: 0     polyethylene glycol (MIRALAX) 17 gram packet, Take 17 g by mouth daily as needed., Disp: , Rfl:      senna-docusate (SENNOSIDES-DOCUSATE SODIUM) 8.6-50 mg tablet, Take 1 tablet by mouth 2 (two) times a day., Disp: , Rfl:      SYNTHROID 88 mcg tablet, Take 88 mcg by mouth daily., Disp: , Rfl:      thiamine 100 MG tablet, Take 1 tablet (100 mg total) by mouth daily., Disp: 30 tablet, Rfl: 0     tiotropium-olodateroL (STIOLTO RESPIMAT) 2.5-2.5 mcg/actuation Mist inhaler, Inhale 1 puff daily as needed (As needed). , Disp: , Rfl:      traZODone (DESYREL) 50 MG tablet, Take 1 tablet (50 mg total) by mouth at bedtime. (Patient taking differently: Take 100 mg by mouth at bedtime. ), Disp: 60 tablet, Rfl: 0    There were no vitals filed for this visit.  Blood pressure 128/72, pulse 80 respirations 16  Physical Exam  Head is normocephalic.  Left posterior scalp does have hematoma.  neck is supple without adenopathy.  There is bruising to the left posterior scalp and neck area.  Lung sounds are clear throughout.  Cardiovascular is normal without murmurs.  No lower extremity edema.  Gastrointestinal soft and nontender and positive bowel sounds.  Musculoskeletal she is an assist of 1.  Not having any discomfort.  Psychiatric: Pleasant affect.    LABS:   Lab Results   Component Value Date    WBC 6.4 08/19/2020    HGB 8.7 (L) 08/19/2020    HCT 26.0 (L) 08/19/2020    MCV 94 08/19/2020     (H) 08/19/2020     Results for orders placed or performed in visit on 08/19/20   Basic Metabolic Panel   Result Value Ref Range    Sodium 132 (L) 136 - 145 mmol/L    Potassium 4.2 3.5 - 5.0 mmol/L    Chloride 98 98 - 107 mmol/L    CO2 25 22 - 31 mmol/L    Anion Gap, Calculation 9 5 - 18 mmol/L     Glucose 89 70 - 125 mg/dL    Calcium 9.1 8.5 - 10.5 mg/dL    BUN 13 8 - 22 mg/dL    Creatinine 0.63 0.60 - 1.10 mg/dL    GFR MDRD Af Amer >60 >60 mL/min/1.73m2    GFR MDRD Non Af Amer >60 >60 mL/min/1.73m2           ASSESSMENT:      ICD-10-CM    1. Gait instability  R26.81    2. Hematoma of scalp, subsequent encounter  S00.03XD    3. Hyponatremia  E87.1    4. Chronic obstructive pulmonary disease, unspecified COPD type (H)  J44.9        PLAN:    Went over her laboratory studies.  Continue with medications and treatments along with fluid restriction.  The increase of the trazodone 100 mg has been helpful.  I did remove the staples today.  Sodium did improve 132 when he used to be 127.  She did not have any other questions.      Electronically signed by: Michael Duane Johnson, CNP

## 2021-06-16 PROBLEM — E87.1 HYPONATREMIA: Status: ACTIVE | Noted: 2020-08-09

## 2021-06-16 PROBLEM — F10.939 ALCOHOL WITHDRAWAL SYNDROME WITH COMPLICATION (H): Status: ACTIVE | Noted: 2020-08-10

## 2021-06-20 NOTE — LETTER
Letter by Johnson, Michael Duane, CNP at      Author: Johnson, Michael Duane, CNP Service: -- Author Type: --    Filed:  Encounter Date: 8/27/2020 Status: (Other)         Patient: Rafaela Garrido   MR Number: [id number]   YOB: 1955   Date of Visit: 8/27/2020     [facility]    Facility:   [facility] [[id number]]   Code Status: FULL CODE  PCP: Maria Elena Morin MD   Phone: [phone]   Fax: [phone]      CHIEF COMPLAINT/REASON FOR VISIT:  Chief Complaint   Patient presents with   ? Discharge Summary       HISTORY COURSE:  Rafaela is a 64 y.o. female who was admitted to the hospital August 9, 2020 through August 13, 2020 secondary to a fall with a scalp laceration.  Her sodium level was 109 and was sent to the intensive care unit for close monitoring and management.  She does have a history of alcohol abuse and also with the admission alcohol intoxication with complications.  She drinks approximately 5 beers per day and the alcohol level was 113.  Did not show or exhibit any signs of alcohol withdrawal.  She was profoundly hyponatremic with a sodium level of 109 baseline is between 122-128.  Urine and serum osmolality were normal.  Suggested that the hyponatremia was secondary to acute cord and ongoing alcohol use disorder.  The sodium did stabilize at 127 and then placed her on a 1800 mL daily fluid restriction.  Regarding the scalp injuries she had a hematoma and a head CT did note the hematoma without fracture.  There was concern for UTI secondary to leukosis and lites on admission were 18.6 did receive 3 days of ceftriaxone but no symptoms of UTI.  Also did have hypomagnesemia with a magnesium level of 1.3.  She also has a history of moderate malnutrition and unintentional weight loss and did receive a regular diet along with mealtime supplements.  Her liver function tests are abnormal.  Lipase was at 15 EKG did show sinus tachycardia.  Negative CT  cervical spine and the chest x-ray was clear.  She has been able to participate with the rehabilitation services.  States that she has been able to ambulate over 300 feet and a low fall risk.  Her slums 19/30.  We had a chance to talk about the strength that she has gained.  Her only issue is that she had bouts of constipation.  For sleep we did increase the trazodone 100 mg in at bedtime rather than 50 mg but at home she was on 200 mg at bedtime.  Sodium level 132 prior to that was 127 on August 14 and she is on a free water restriction of 1800 cc.  Regular diet.  Getting extra nutrient supplements.  Review of Systems  She currently denies any fever chills fatigue cough or cold or flulike symptoms nausea vomiting diarrhea dysuria headache rashes unusual myalgias or arthralgias.  History of alcohol intoxication, anxiety, hypertension, COPD.  There were no vitals filed for this visit.  Blood pressure 112/76, pulse 80, respirations 18 and temperature 97.1  Physical Exam  Head is normocephalic.   neck is supple without adenopathy.  Lung sounds are clear throughout.  Cardiovascular is normal without murmurs.  No lower extremity edema.  Gastrointestinal soft and nontender.  Musculoskeletal ; able to ambulate over 300 feet.  Not having any discomfort.  Psychiatric: Pleasant affect.        Lab Results   Component Value Date    WBC 6.4 08/19/2020    HGB 8.7 (L) 08/19/2020    HCT 26.0 (L) 08/19/2020    MCV 94 08/19/2020     (H) 08/19/2020     Results for orders placed or performed in visit on 08/19/20   Basic Metabolic Panel   Result Value Ref Range    Sodium 132 (L) 136 - 145 mmol/L    Potassium 4.2 3.5 - 5.0 mmol/L    Chloride 98 98 - 107 mmol/L    CO2 25 22 - 31 mmol/L    Anion Gap, Calculation 9 5 - 18 mmol/L    Glucose 89 70 - 125 mg/dL    Calcium 9.1 8.5 - 10.5 mg/dL    BUN 13 8 - 22 mg/dL    Creatinine 0.63 0.60 - 1.10 mg/dL    GFR MDRD Af Amer >60 >60 mL/min/1.73m2    GFR MDRD Non Af Amer >60 >60 mL/min/1.73m2        Lab Results   Component Value Date    ALT 14 08/09/2020    AST 30 08/09/2020    ALKPHOS 59 08/09/2020    BILITOT 0.3 08/09/2020 August 19, 2020 magnesium 1.7  Lab Results   Component Value Date    TSH 4.60 08/09/2020       MEDICATION LIST:  Current Outpatient Medications   Medication Sig   ? acetaminophen (TYLENOL) 500 MG tablet Take 1,000 mg by mouth daily before breakfast.   ? albuterol (PROVENTIL HFA;VENTOLIN HFA) 90 mcg/actuation inhaler Inhale 2 puffs every 6 (six) hours as needed for wheezing.   ? DULoxetine (CYMBALTA) 60 MG capsule Take 60 mg by mouth daily.   ? folic acid (FOLVITE) 1 MG tablet Take 1 tablet (1 mg total) by mouth daily.   ? lisinopril (PRINIVIL,ZESTRIL) 10 MG tablet Take 10 mg by mouth daily.   ? multivitamin therapeutic tablet Take 1 tablet by mouth daily.   ? polyethylene glycol (MIRALAX) 17 gram packet Take 17 g by mouth daily as needed.   ? senna-docusate (SENNOSIDES-DOCUSATE SODIUM) 8.6-50 mg tablet Take 1 tablet by mouth 2 (two) times a day.   ? SYNTHROID 88 mcg tablet Take 88 mcg by mouth daily.   ? thiamine 100 MG tablet Take 1 tablet (100 mg total) by mouth daily.   ? tiotropium-olodateroL (STIOLTO RESPIMAT) 2.5-2.5 mcg/actuation Mist inhaler Inhale 1 puff daily as needed (As needed).    ? traZODone (DESYREL) 50 MG tablet Take 1 tablet (50 mg total) by mouth at bedtime. (Patient taking differently: Take 100 mg by mouth at bedtime. )       DISCHARGE DIAGNOSIS:    ICD-10-CM    1. Hyponatremia  E87.1    2. Chronic obstructive pulmonary disease, unspecified COPD type (H)  J44.9    3. Essential hypertension  I10    4. Anxiety and depression  F41.9     F32.9        MEDICAL EQUIPMENT NEEDS:  None    DISCHARGE PLAN/FACE TO FACE:  I certify that services are/were furnished while this patient was under the care of a physician and that a physician or an allowed non-physician practitioner (NPP), had a face-to-face encounter that meets the physician face-to-face encounter requirements.  The encounter was in whole, or in part, related to the primary reason for home health. The patient is confined to his/her home and needs intermittent skilled nursing, physical therapy, speech-language pathology, or the continued need for occupational therapy. A plan of care has been established by a physician and is periodically reviewed by a physician.  Date of Face-to-Face Encounter: August 27, 2020    I certify that, based on my findings, the following services are medically necessary home health services: She will be discharging to home with current medications along with home care services from [facility] including physical therapy and nursing.  Her anticipated discharge date is August 29, 2020    My clinical findings support the need for the above skilled services because: (Please write a brief narrative summary that describes what the RN, PT, SLP, or other services will be doing in the home. A list of diagnoses in this section does not meet the CMS requirements.)  She will require the skilled services secondary to not only most recent hospitalization but also home safety and transfers along with nursing medication management.    This patient is homebound because: (Please write a brief narrative summary describing the functional limitations as to why this patient is homebound and specifically what makes this patient homebound.)  Secondary to chronic multiple medical conditions including her recent hospitalization along with home safety and nursing for medication management    The patient is, or has been, under my care and I have initiated the establishment of the plan of care. This patient will be followed by a physician who will periodically review the plan of care.    Schedule follow up visit with primary care provider within 7 days to reestablish care.  She will follow-up with her primary care doctor regarding medication management and any future laboratory including her sodium level.  We went over medications  again and does feel comfortable with the current medications as well as her overall care and did feel that it was a very successful and productive transitional care stay.    Discharge coordination care greater than 30 minutes  Electronically signed by: Michael Duane Johnson, CNP

## 2021-06-20 NOTE — LETTER
Letter by David Pereira DO at      Author: David Pereira DO Service: -- Author Type: --    Filed:  Encounter Date: 8/19/2020 Status: (Other)         Patient: Rafaela Garrido   MR Number: [id number]   YOB: 1955   Date of Visit: 8/19/2020     [facility]      Facility:    [facility] [[id number]]  Code Status: FULL CODE      Chief Complaint/Reason for Visit:  Chief Complaint   Patient presents with   ? H & P     Severe hyponatremia, alcoholic intoxication, anxiety and depression, essential hypertension, chronic obstructive pulmonary disease, low magnesium levels, acute cystitis without hematuria, alcohol withdrawal syndrome with complications, tobacco abuse.       HPI:   Rafaela is a 64 y.o. female who admitted to the hospital 8/9/2020.  She does have COPD from years of smoking and also hypothyroidism.  Hypertension she also does use a lot of alcohol and she came into the hospital through the [facility] emergency department have a low sodium of 109.  There is concern for acute alcohol withdrawal and blood alcohol was 113.  She did not exhibit any signs of acute alcohol withdrawal during the inpatient stay.  And she is open to quitting drinking.  She had a scalp laceration that was repaired.    Patient also had profound hyponatremia acutely on top of chronic hyponatremia with a baseline sodium of 1 22-1 28.  On the 14th it was rechecked and it was 127 and will continue to monitor.  In the hospital she was given careful rehydration electrolyte management.  Her magnesium was also low.  Potassium was pretty much normal.  She was treated properly and transferred here to the TCU at [facility] in stable condition.    Patient seen in chair comfortably says she feels okay we did discuss her drinking and smoking in detail and she said she is quit both of them does not want to go back.  I did explain her sodium on the 14th was 127 which is within her  normal baseline.  She says she does not have any pain issues but pain in her head and she does have the sutures in at this time.    Sutures are curled around difficult to take out nurse practitioner is going to take them out tomorrow and he knows about this and has been talking to the nursing staff.  She otherwise is doing okay and has no new issues.    Past Medical History:  Past Medical History:   Diagnosis Date   ? Adrenal adenoma, left    ? Anxiety     per pt   ? Depression     per pt   ? History of alcohol abuse    ? History of CVA with residual deficit     head and neck tremor, right sided weakness   ? History of skin cancer of unknown type    ? Hypertension     on lisinopril per pt is well controlled   ? Memory impairment     per pt   ? Postsurgical hypothyroidism    ? Thyroid cancer (H)    ? Tobacco use disorder    ? Urinary incontinence            Surgical History:  Past Surgical History:   Procedure Laterality Date   ? APPENDECTOMY     ? History of  section     ? MIDLINE INSERTION - DOUBLE LUMEN  8/10/2020        ? THYROID SURGERY         Family History:   Family History   Adopted: Yes   Problem Relation Age of Onset   ? Hypertension Mother    ? Diabetes Sister        Social History:    Social History     Socioeconomic History   ? Marital status:      Spouse name: None   ? Number of children: None   ? Years of education: None   ? Highest education level: None   Occupational History   ? None   Social Needs   ? Financial resource strain: None   ? Food insecurity     Worry: None     Inability: None   ? Transportation needs     Medical: None     Non-medical: None   Tobacco Use   ? Smoking status: Current Every Day Smoker     Packs/day: 1.00     Years: 10.00     Pack years: 10.00   ? Smokeless tobacco: Never Used   Substance and Sexual Activity   ? Alcohol use: Yes     Alcohol/week: 35.0 standard drinks     Types: 35 Cans of beer per week     Comment: 4-5 beers per day   ? Drug use: No      Comment: pt denies using drugs of any kind   ? Sexual activity: None   Lifestyle   ? Physical activity     Days per week: None     Minutes per session: None   ? Stress: None   Relationships   ? Social connections     Talks on phone: None     Gets together: None     Attends [employer] service: None     Active member of club or organization: None     Attends meetings of clubs or organizations: None     Relationship status: None   ? Intimate partner violence     Fear of current or ex partner: None     Emotionally abused: None     Physically abused: None     Forced sexual activity: None   Other Topics Concern   ? None   Social History Narrative   ? None          Review of Systems   Constitutional:        Patient denies any pain fevers chills nausea vomit diarrhea change in vision hearing taste or smell weakness one-sided chest pain shortness of breath.  Is no current shortness no polyphagia polydipsia polyuria depression or anxiety and the main review of systems is negative.       Vitals:    08/19/20 1232   BP: 131/61   Pulse: 71   Resp: 16   Temp: 96.7  F (35.9  C)   SpO2: 99%       Physical Exam  Constitutional:       General: She is not in acute distress.  HENT:      Head:      Comments: Sutures are still embedded and there will come out tomorrow.     Mouth/Throat:      Mouth: Mucous membranes are moist.      Pharynx: Oropharynx is clear.   Eyes:      General:         Right eye: No discharge.         Left eye: No discharge.   Cardiovascular:      Pulses: Normal pulses.   Pulmonary:      Effort: Pulmonary effort is normal. No respiratory distress.   Abdominal:      General: There is distension.      Tenderness: There is no abdominal tenderness.   Skin:     General: Skin is warm and dry.   Neurological:      Mental Status: She is alert. Mental status is at baseline.   Psychiatric:         Mood and Affect: Mood normal.         Behavior: Behavior normal.         Medication List:  Current Outpatient Medications    Medication Sig   ? albuterol (PROVENTIL HFA;VENTOLIN HFA) 90 mcg/actuation inhaler Inhale 2 puffs every 6 (six) hours as needed for wheezing.   ? DULoxetine (CYMBALTA) 60 MG capsule Take 60 mg by mouth daily.   ? folic acid (FOLVITE) 1 MG tablet Take 1 tablet (1 mg total) by mouth daily.   ? lisinopril (PRINIVIL,ZESTRIL) 10 MG tablet Take 10 mg by mouth daily.   ? multivitamin therapeutic tablet Take 1 tablet by mouth daily.   ? polyethylene glycol (MIRALAX) 17 gram packet Take 17 g by mouth daily as needed.   ? senna-docusate (SENNOSIDES-DOCUSATE SODIUM) 8.6-50 mg tablet Take 1 tablet by mouth 2 (two) times a day.   ? SYNTHROID 88 mcg tablet Take 88 mcg by mouth daily.   ? thiamine 100 MG tablet Take 1 tablet (100 mg total) by mouth daily.   ? tiotropium-olodateroL (STIOLTO RESPIMAT) 2.5-2.5 mcg/actuation Mist inhaler Inhale 1 puff daily as needed (As needed).    ? traZODone (DESYREL) 50 MG tablet Take 1 tablet (50 mg total) by mouth at bedtime. (Patient taking differently: Take 100 mg by mouth at bedtime. )       Labs: On 8/14/2020 sodium was 127, potassium 4.2, CO2 was 26, calcium was 9.4, BUN was 10, creatinine 0.57, GFR is greater than 60.      Assessment:    ICD-10-CM    1. Hyponatremia  E87.1    2. Hypomagnesemia  E83.42    3. Essential hypertension  I10    4. General weakness  R53.1        Plan: Plan at this time will continue with physical and occupational therapy at this time we will also check a magnesium tomorrow and check another sodium tomorrow.  We will also had nurse practitioner remove the stay sutures tomorrow if not they can get it then she will need to go in to have the removed.  However I believe our nurse petitioner can do that okay at this time.  I will continue to monitor above medical problems and no other changes to care plan at this time.        Electronically signed by: David Pereira, DO

## 2021-06-20 NOTE — LETTER
Letter by Johnson, Michael Duane, CNP at      Author: Johnson, Michael Duane, CNP Service: -- Author Type: --    Filed:  Encounter Date: 8/25/2020 Status: (Other)         Patient: Rafaela Garrido   MR Number: [id number]   YOB: 1955   Date of Visit: 8/25/2020     [facility]    Facility:   [facility] [[id number]]   Code Status: FULL CODE      CHIEF COMPLAINT/REASON FOR VISIT:  Chief Complaint   Patient presents with   ? Problem Visit     hx cva, rehab, htn,sleep       HISTORY:      HPI: Rafaela is a 64 y.o. female who the transitional care unit and who I had the opportunity to revisit with not only secondary to her hospitalization August 9, 2020 through August 13, 2020 secondary to a fall sustaining a left scalp laceration  Hyponatremia along with discussion of her rehabilitation process.  Regarding rehabilitation I did have a chance to talk to  and staff to see if she would be eligible to be able to go home.  She wants to go home soon as possible and she does feel like she is learning some things here but would like to go home.  Does have a history of CVA along with a history of gait instability.  She is on a regular diet and getting extra nutrient supplements.  No respiratory issues and no inhalers as needed.  She has been normotensive and afebrile and also on room air.  The left scalp does have a small hematoma but healing well.  Recently increase the trazodone 100 mg and that has been helpful for her sleep.  Denies discomfort.  Constipation is resolved.    Past Medical History:   Diagnosis Date   ? Adrenal adenoma, left    ? Anxiety     per pt   ? Depression     per pt   ? History of alcohol abuse    ? History of CVA with residual deficit     head and neck tremor, right sided weakness   ? History of skin cancer of unknown type    ? Hypertension     on lisinopril per pt is well controlled   ? Memory impairment     per pt   ? Postsurgical  hypothyroidism    ? Thyroid cancer (H)    ? Tobacco use disorder    ? Urinary incontinence              Family History   Adopted: Yes   Problem Relation Age of Onset   ? Hypertension Mother    ? Diabetes Sister      Social History     Socioeconomic History   ? Marital status:      Spouse name: Not on file   ? Number of children: Not on file   ? Years of education: Not on file   ? Highest education level: Not on file   Occupational History   ? Not on file   Social Needs   ? Financial resource strain: Not on file   ? Food insecurity     Worry: Not on file     Inability: Not on file   ? Transportation needs     Medical: Not on file     Non-medical: Not on file   Tobacco Use   ? Smoking status: Current Every Day Smoker     Packs/day: 1.00     Years: 10.00     Pack years: 10.00   ? Smokeless tobacco: Never Used   Substance and Sexual Activity   ? Alcohol use: Yes     Alcohol/week: 35.0 standard drinks     Types: 35 Cans of beer per week     Comment: 4-5 beers per day   ? Drug use: No     Comment: pt denies using drugs of any kind   ? Sexual activity: Not on file   Lifestyle   ? Physical activity     Days per week: Not on file     Minutes per session: Not on file   ? Stress: Not on file   Relationships   ? Social connections     Talks on phone: Not on file     Gets together: Not on file     Attends Cheondoism service: Not on file     Active member of club or organization: Not on file     Attends meetings of clubs or organizations: Not on file     Relationship status: Not on file   ? Intimate partner violence     Fear of current or ex partner: Not on file     Emotionally abused: Not on file     Physically abused: Not on file     Forced sexual activity: Not on file   Other Topics Concern   ? Not on file   Social History Narrative   ? Not on file         Review of Systems  She currently denies any fever chills fatigue cough or cold or flulike symptoms nausea vomiting diarrhea dysuria headache rashes unusual myalgias  or arthralgias.  History of alcohol intoxication, anxiety, hypertension, COPD.    Current Outpatient Medications   Medication Sig Note   ? albuterol (PROVENTIL HFA;VENTOLIN HFA) 90 mcg/actuation inhaler Inhale 2 puffs every 6 (six) hours as needed for wheezing.    ? DULoxetine (CYMBALTA) 60 MG capsule Take 60 mg by mouth daily.    ? folic acid (FOLVITE) 1 MG tablet Take 1 tablet (1 mg total) by mouth daily.    ? lisinopril (PRINIVIL,ZESTRIL) 10 MG tablet Take 10 mg by mouth daily.    ? multivitamin therapeutic tablet Take 1 tablet by mouth daily.    ? polyethylene glycol (MIRALAX) 17 gram packet Take 17 g by mouth daily as needed.    ? senna-docusate (SENNOSIDES-DOCUSATE SODIUM) 8.6-50 mg tablet Take 1 tablet by mouth 2 (two) times a day.    ? SYNTHROID 88 mcg tablet Take 88 mcg by mouth daily.    ? thiamine 100 MG tablet Take 1 tablet (100 mg total) by mouth daily.    ? tiotropium-olodateroL (STIOLTO RESPIMAT) 2.5-2.5 mcg/actuation Mist inhaler Inhale 1 puff daily as needed (As needed).  8/9/2020: Prescribed but patient not compliant    ? traZODone (DESYREL) 50 MG tablet Take 1 tablet (50 mg total) by mouth at bedtime. (Patient taking differently: Take 100 mg by mouth at bedtime. )        There were no vitals filed for this visit.  Blood pressure 96/52, pulse 78, respirations 16, temperature 97.8  Physical Exam  Head is normocephalic.  Left posterior scalp does have hematoma.  neck is supple without adenopathy. There is bruising to the left posterior scalp and neck area.  Lung sounds are clear throughout.  Cardiovascular is normal without murmurs.  No lower extremity edema.  Gastrointestinal soft and nontender and positive bowel sounds.  Musculoskeletal she is an assist of 1.  Not having any discomfort.  Psychiatric: Pleasant affect.      LABS:   Lab Results   Component Value Date    WBC 6.4 08/19/2020    HGB 8.7 (L) 08/19/2020    HCT 26.0 (L) 08/19/2020    MCV 94 08/19/2020     (H) 08/19/2020     Results  for orders placed or performed in visit on 08/19/20   Basic Metabolic Panel   Result Value Ref Range    Sodium 132 (L) 136 - 145 mmol/L    Potassium 4.2 3.5 - 5.0 mmol/L    Chloride 98 98 - 107 mmol/L    CO2 25 22 - 31 mmol/L    Anion Gap, Calculation 9 5 - 18 mmol/L    Glucose 89 70 - 125 mg/dL    Calcium 9.1 8.5 - 10.5 mg/dL    BUN 13 8 - 22 mg/dL    Creatinine 0.63 0.60 - 1.10 mg/dL    GFR MDRD Af Amer >60 >60 mL/min/1.73m2    GFR MDRD Non Af Amer >60 >60 mL/min/1.73m2       Lab Results   Component Value Date    ALT 14 08/09/2020    AST 30 08/09/2020    ALKPHOS 59 08/09/2020    BILITOT 0.3 08/09/2020         ASSESSMENT:      ICD-10-CM    1. Chronic obstructive pulmonary disease, unspecified COPD type (H)  J44.9    2. Essential hypertension  I10    3. Gait instability  R26.81    4. Anxiety and depression  F41.9     F32.9        PLAN:    She is examined.  Blood pressures are good.  She has participated in therapy.  Once again I did relate to the staff and  including charge nurse that she would like a meeting to talk up going home.      Electronically signed by: Michael Duane Johnson, CNP

## 2021-06-20 NOTE — LETTER
Letter by Johnson, Michael Duane, CNP at      Author: Johnson, Michael Duane, CNP Service: -- Author Type: --    Filed:  Encounter Date: 8/20/2020 Status: (Other)         Patient: Rafaela Garrido   MR Number: [id number]   YOB: 1955   Date of Visit: 8/20/2020     [facility]    Facility:   [facility] [[id number]]   Code Status: FULL CODE      CHIEF COMPLAINT/REASON FOR VISIT:  Chief Complaint   Patient presents with   ? Problem Visit     rehab, fall, htn, bp, labs,        HISTORY:      HPI: Rafaela is a 64 y.o. female who I had the pleasure revisiting with not only secondary to her hospitalization August 9 through August 13, 2020 secondary to a fall sustaining a scalp laceration along with hyponatremia as well as discussion of her rehabilitation process and laboratory studies  Regarding therapy they would like to keep her here another 2 weeks.  The dizziness has significantly decreased.  There is a questionable concerns regarding balance and her slums 19/30.  Went over her laboratory studies and the sodium is now 132 prior to that on August 14 the sodium was at 127.  Magnesium 1.7.  She does have a couple scalp staples in place the staff did not remove them so I was able to remove them today.  No constipation.  For sleep the 100 mg of trazodone rather than the 50 mg has been helpful.  She is in good spirits today.  Did not have any other questions    Past Medical History:   Diagnosis Date   ? Adrenal adenoma, left    ? Anxiety     per pt   ? Depression     per pt   ? History of alcohol abuse    ? History of CVA with residual deficit     head and neck tremor, right sided weakness   ? History of skin cancer of unknown type    ? Hypertension     on lisinopril per pt is well controlled   ? Memory impairment     per pt   ? Postsurgical hypothyroidism    ? Thyroid cancer (H)    ? Tobacco use disorder    ? Urinary incontinence              Family History   Adopted:  Yes   Problem Relation Age of Onset   ? Hypertension Mother    ? Diabetes Sister      Social History     Socioeconomic History   ? Marital status:      Spouse name: Not on file   ? Number of children: Not on file   ? Years of education: Not on file   ? Highest education level: Not on file   Occupational History   ? Not on file   Social Needs   ? Financial resource strain: Not on file   ? Food insecurity     Worry: Not on file     Inability: Not on file   ? Transportation needs     Medical: Not on file     Non-medical: Not on file   Tobacco Use   ? Smoking status: Current Every Day Smoker     Packs/day: 1.00     Years: 10.00     Pack years: 10.00   ? Smokeless tobacco: Never Used   Substance and Sexual Activity   ? Alcohol use: Yes     Alcohol/week: 35.0 standard drinks     Types: 35 Cans of beer per week     Comment: 4-5 beers per day   ? Drug use: No     Comment: pt denies using drugs of any kind   ? Sexual activity: Not on file   Lifestyle   ? Physical activity     Days per week: Not on file     Minutes per session: Not on file   ? Stress: Not on file   Relationships   ? Social connections     Talks on phone: Not on file     Gets together: Not on file     Attends Mor[employer] service: Not on file     Active member of club or organization: Not on file     Attends meetings of clubs or organizations: Not on file     Relationship status: Not on file   ? Intimate partner violence     Fear of current or ex partner: Not on file     Emotionally abused: Not on file     Physically abused: Not on file     Forced sexual activity: Not on file   Other Topics Concern   ? Not on file   Social History Narrative   ? Not on file         Review of Systems  She currently denies any fever chills fatigue cough or cold or flulike symptoms nausea vomiting diarrhea dysuria headache rashes unusual myalgias or arthralgias.  History of alcohol intoxication, anxiety, hypertension, COPD.      Current Outpatient Medications:   ?  albuterol  (PROVENTIL HFA;VENTOLIN HFA) 90 mcg/actuation inhaler, Inhale 2 puffs every 6 (six) hours as needed for wheezing., Disp: 1 Inhaler, Rfl: 0  ?  DULoxetine (CYMBALTA) 60 MG capsule, Take 60 mg by mouth daily., Disp: , Rfl:   ?  folic acid (FOLVITE) 1 MG tablet, Take 1 tablet (1 mg total) by mouth daily., Disp: 30 tablet, Rfl: 0  ?  lisinopril (PRINIVIL,ZESTRIL) 10 MG tablet, Take 10 mg by mouth daily., Disp: , Rfl:   ?  multivitamin therapeutic tablet, Take 1 tablet by mouth daily., Disp: 30 tablet, Rfl: 0  ?  polyethylene glycol (MIRALAX) 17 gram packet, Take 17 g by mouth daily as needed., Disp: , Rfl:   ?  senna-docusate (SENNOSIDES-DOCUSATE SODIUM) 8.6-50 mg tablet, Take 1 tablet by mouth 2 (two) times a day., Disp: , Rfl:   ?  SYNTHROID 88 mcg tablet, Take 88 mcg by mouth daily., Disp: , Rfl:   ?  thiamine 100 MG tablet, Take 1 tablet (100 mg total) by mouth daily., Disp: 30 tablet, Rfl: 0  ?  tiotropium-olodateroL (STIOLTO RESPIMAT) 2.5-2.5 mcg/actuation Mist inhaler, Inhale 1 puff daily as needed (As needed). , Disp: , Rfl:   ?  traZODone (DESYREL) 50 MG tablet, Take 1 tablet (50 mg total) by mouth at bedtime. (Patient taking differently: Take 100 mg by mouth at bedtime. ), Disp: 60 tablet, Rfl: 0    There were no vitals filed for this visit.  Blood pressure 128/72, pulse 80 respirations 16  Physical Exam  Head is normocephalic.  Left posterior scalp does have hematoma.  neck is supple without adenopathy.  There is bruising to the left posterior scalp and neck area.  Lung sounds are clear throughout.  Cardiovascular is normal without murmurs.  No lower extremity edema.  Gastrointestinal soft and nontender and positive bowel sounds.  Musculoskeletal she is an assist of 1.  Not having any discomfort.  Psychiatric: Pleasant affect.    LABS:   Lab Results   Component Value Date    WBC 6.4 08/19/2020    HGB 8.7 (L) 08/19/2020    HCT 26.0 (L) 08/19/2020    MCV 94 08/19/2020     (H) 08/19/2020     Results for  orders placed or performed in visit on 08/19/20   Basic Metabolic Panel   Result Value Ref Range    Sodium 132 (L) 136 - 145 mmol/L    Potassium 4.2 3.5 - 5.0 mmol/L    Chloride 98 98 - 107 mmol/L    CO2 25 22 - 31 mmol/L    Anion Gap, Calculation 9 5 - 18 mmol/L    Glucose 89 70 - 125 mg/dL    Calcium 9.1 8.5 - 10.5 mg/dL    BUN 13 8 - 22 mg/dL    Creatinine 0.63 0.60 - 1.10 mg/dL    GFR MDRD Af Amer >60 >60 mL/min/1.73m2    GFR MDRD Non Af Amer >60 >60 mL/min/1.73m2           ASSESSMENT:      ICD-10-CM    1. Gait instability  R26.81    2. Hematoma of scalp, subsequent encounter  S00.03XD    3. Hyponatremia  E87.1    4. Chronic obstructive pulmonary disease, unspecified COPD type (H)  J44.9        PLAN:    Went over her laboratory studies.  Continue with medications and treatments along with fluid restriction.  The increase of the trazodone 100 mg has been helpful.  I did remove the staples today.  Sodium did improve 132 when he used to be 127.  She did not have any other questions.      Electronically signed by: Michael Duane Johnson, CNP

## 2021-06-20 NOTE — LETTER
Letter by Johnson, Michael Duane, CNP at      Author: Johnson, Michael Duane, CNP Service: -- Author Type: --    Filed:  Encounter Date: 8/18/2020 Status: (Other)         Patient: Rafaela Garrido   MR Number: [id number]   YOB: 1955   Date of Visit: 8/18/2020     [facility]    Facility:   [facility] [[id number]]   Code Status: FULL CODE      CHIEF COMPLAINT/REASON FOR VISIT:  Chief Complaint   Patient presents with   ? Problem Visit     tcu, cog, nutrition, hypoNat, etoh       HISTORY:      HPI: Rafaela is a 64 y.o. female who was admitted to the hospital August 9, 2020 through August 13, 2020 secondary to a fall with a scalp laceration.  Her sodium level was 109 and was sent to the intensive care unit for close monitoring and management.  She does have a history of alcohol abuse and also with the admission alcohol intoxication with complications.  She drinks approximately 5 beers per day and the alcohol level was 113.  Did not show or exhibit any signs of alcohol withdrawal.  She was profoundly hyponatremic with a sodium level of 109 baseline is between 122-128.  Urine and serum osmolality were normal.  Suggested that the hyponatremia was secondary to acute cord and ongoing alcohol use disorder.  The sodium did stabilize at 127 and then placed her on a 1800 mL daily fluid restriction.  Regarding the scalp injuries she had a hematoma and a head CT did note the hematoma without fracture.  There was concern for UTI secondary to leukosis and lites on admission were 18.6 did receive 3 days of ceftriaxone but no symptoms of UTI.  Also did have hypomagnesemia with a magnesium level of 1.3.  She also has a history of moderate malnutrition and unintentional weight loss and did receive a regular diet along with mealtime supplements.  Her liver function tests are abnormal.  Lipase was at 15 EKG did show sinus tachycardia.  Negative CT cervical spine and the chest  x-ray was clear.  She currently is in the transitional care unit to try to improve her strength balance and overall conditioning.  She is working with a walker in the transitional care unit at home she also does have a four-wheel walker but otherwise self propelling a wheelchair in the hallway.  She initially is from [place] and lived there in [facility] for about 50 years and now living in [place].  Yesterday I did receive a phone call that she is constipated I did do a suppository and put her on some stool softeners which have been successful.  She currently has been normotensive and afebrile and also on room air.  Her appetite is good.  Denies discomfort.  Had a chat talk about her laboratory studies as well as repeat these laboratory studies along with the rehabilitation process.     Past Medical History:   Diagnosis Date   ? Adrenal adenoma, left    ? Anxiety     per pt   ? Depression     per pt   ? History of alcohol abuse    ? History of CVA with residual deficit     head and neck tremor, right sided weakness   ? History of skin cancer of unknown type    ? Hypertension     on lisinopril per pt is well controlled   ? Memory impairment     per pt   ? Postsurgical hypothyroidism    ? Thyroid cancer (H)    ? Tobacco use disorder    ? Urinary incontinence              Family History   Adopted: Yes   Problem Relation Age of Onset   ? Hypertension Mother    ? Diabetes Sister      Social History     Socioeconomic History   ? Marital status:      Spouse name: Not on file   ? Number of children: Not on file   ? Years of education: Not on file   ? Highest education level: Not on file   Occupational History   ? Not on file   Social Needs   ? Financial resource strain: Not on file   ? Food insecurity     Worry: Not on file     Inability: Not on file   ? Transportation needs     Medical: Not on file     Non-medical: Not on file   Tobacco Use   ? Smoking status: Current Every Day Smoker     Packs/day:  1.00     Years: 10.00     Pack years: 10.00   ? Smokeless tobacco: Never Used   Substance and Sexual Activity   ? Alcohol use: Yes     Alcohol/week: 35.0 standard drinks     Types: 35 Cans of beer per week     Comment: 4-5 beers per day   ? Drug use: No     Comment: pt denies using drugs of any kind   ? Sexual activity: Not on file   Lifestyle   ? Physical activity     Days per week: Not on file     Minutes per session: Not on file   ? Stress: Not on file   Relationships   ? Social connections     Talks on phone: Not on file     Gets together: Not on file     Attends Synagogue service: Not on file     Active member of club or organization: Not on file     Attends meetings of clubs or organizations: Not on file     Relationship status: Not on file   ? Intimate partner violence     Fear of current or ex partner: Not on file     Emotionally abused: Not on file     Physically abused: Not on file     Forced sexual activity: Not on file   Other Topics Concern   ? Not on file   Social History Narrative   ? Not on file         Review of Systems  She currently denies any fever chills fatigue cough or cold or flulike symptoms nausea vomiting diarrhea dysuria headache rashes unusual myalgias or arthralgias.  History of alcohol intoxication, anxiety, hypertension, COPD.    Current Outpatient Medications:   ?  albuterol (PROVENTIL HFA;VENTOLIN HFA) 90 mcg/actuation inhaler, Inhale 2 puffs every 6 (six) hours as needed for wheezing., Disp: 1 Inhaler, Rfl: 0  ?  DULoxetine (CYMBALTA) 60 MG capsule, Take 60 mg by mouth daily., Disp: , Rfl:   ?  folic acid (FOLVITE) 1 MG tablet, Take 1 tablet (1 mg total) by mouth daily., Disp: 30 tablet, Rfl: 0  ?  lisinopril (PRINIVIL,ZESTRIL) 10 MG tablet, Take 10 mg by mouth daily., Disp: , Rfl:   ?  multivitamin therapeutic tablet, Take 1 tablet by mouth daily., Disp: 30 tablet, Rfl: 0  ?  SYNTHROID 88 mcg tablet, Take 88 mcg by mouth daily., Disp: , Rfl:   ?  thiamine 100 MG tablet, Take 1  tablet (100 mg total) by mouth daily., Disp: 30 tablet, Rfl: 0  ?  tiotropium-olodateroL (STIOLTO RESPIMAT) 2.5-2.5 mcg/actuation Mist inhaler, Inhale 1 puff daily as needed (As needed). , Disp: , Rfl:   ?  traZODone (DESYREL) 50 MG tablet, Take 1 tablet (50 mg total) by mouth at bedtime. (Patient taking differently: Take 100 mg by mouth at bedtime. ), Disp: 60 tablet, Rfl: 0    There were no vitals filed for this visit.  Blood pressure 130/76 pulse 82 respiration 16 temperature 97.4 saturation room air 99%  Physical Exam  Head is normocephalic.  Left posterior scalp does have a hematoma in a couple of staples.  Conjunctiva is clear neck without adenopathy.  There is bruising to the left posterior scalp and neck area.  Lung sounds are clear throughout.  Cardiovascular is normal without murmurs.  No lower extremity edema.  Gastrointestinal soft and nontender and positive bowel sounds.  Musculoskeletal she is an assist of 1.  Not having any comfort.  Psychiatric: Pleasant affect.  LABS:   Lab Results   Component Value Date    WBC 8.1 08/10/2020    HGB 10.9 (L) 08/10/2020    HCT 29.7 (L) 08/10/2020    MCV 87 08/10/2020     08/10/2020     Results for orders placed or performed in visit on 08/14/20   Basic Metabolic Panel   Result Value Ref Range    Sodium 127 (L) 136 - 145 mmol/L    Potassium 4.2 3.5 - 5.0 mmol/L    Chloride 92 (L) 98 - 107 mmol/L    CO2 26 22 - 31 mmol/L    Anion Gap, Calculation 9 5 - 18 mmol/L    Glucose 93 70 - 125 mg/dL    Calcium 9.4 8.5 - 10.5 mg/dL    BUN 10 8 - 22 mg/dL    Creatinine 0.57 (L) 0.60 - 1.10 mg/dL    GFR MDRD Af Amer >60 >60 mL/min/1.73m2    GFR MDRD Non Af Amer >60 >60 mL/min/1.73m2     Lab Results   Component Value Date    TSH 4.60 08/09/2020       No results found for: CHOL  No results found for: HDL  No results found for: LDLCALC  No results found for: TRIG  No components found for: CHOLHDL  Lab Results   Component Value Date    ALBUMIN 3.1 (L) 08/10/2020          ASSESSMENT:      ICD-10-CM    1. Hyponatremia  E87.1    2. General weakness  R53.1    3. Hypomagnesemia  E83.42    4. Essential hypertension  I10        PLAN:    Had a chance to go over her laboratory studies and they did recheck her TSH.  Meantime tomorrow we will check her BMP magnesium and CBC due to hyponatremia exists.  The suppository and stool softeners are helpful we will remove the staples of her scalp today.  Getting extra nutrient supplements twice daily.  She states that she is about 50% better since the hospitalization and will be working with therapy.    For documentation purposes total visit 35 minutes to which over 50% was spent with the patient going over her hospitalization and her chronic medical conditions along with her laboratory studies the stay on the transitional care unit, nutrition, future laboratory studies and management of her chronic medical conditions.        Electronically signed by: Michael Duane Johnson, CNP